# Patient Record
Sex: MALE | Race: OTHER | Employment: UNEMPLOYED | URBAN - METROPOLITAN AREA
[De-identification: names, ages, dates, MRNs, and addresses within clinical notes are randomized per-mention and may not be internally consistent; named-entity substitution may affect disease eponyms.]

---

## 2021-07-05 ENCOUNTER — APPOINTMENT (OUTPATIENT)
Dept: GENERAL RADIOLOGY | Age: 51
DRG: 192 | End: 2021-07-05

## 2021-07-05 ENCOUNTER — HOSPITAL ENCOUNTER (INPATIENT)
Age: 51
LOS: 2 days | Discharge: HOME | DRG: 192 | End: 2021-07-07
Attending: EMERGENCY MEDICINE | Admitting: INTERNAL MEDICINE

## 2021-07-05 DIAGNOSIS — J44.1 COPD EXACERBATION (HCC): Primary | ICD-10-CM

## 2021-07-05 LAB
ABSOLUTE EOS #: 1.39 K/UL (ref 0–0.44)
ABSOLUTE IMMATURE GRANULOCYTE: 0.04 K/UL (ref 0–0.3)
ABSOLUTE LYMPH #: 3.11 K/UL (ref 1.1–3.7)
ABSOLUTE MONO #: 0.94 K/UL (ref 0.1–1.2)
ANION GAP SERPL CALCULATED.3IONS-SCNC: 13 MMOL/L (ref 9–17)
BASOPHILS # BLD: 1 % (ref 0–2)
BASOPHILS ABSOLUTE: 0.13 K/UL (ref 0–0.2)
BUN BLDV-MCNC: 13 MG/DL (ref 6–20)
BUN/CREAT BLD: 20 (ref 9–20)
CALCIUM SERPL-MCNC: 9.9 MG/DL (ref 8.6–10.4)
CHLORIDE BLD-SCNC: 103 MMOL/L (ref 98–107)
CO2: 21 MMOL/L (ref 20–31)
CREAT SERPL-MCNC: 0.64 MG/DL (ref 0.7–1.2)
D-DIMER QUANTITATIVE: 0.38 MG/L FEU (ref 0–0.59)
DIFFERENTIAL TYPE: ABNORMAL
EOSINOPHILS RELATIVE PERCENT: 11 % (ref 1–4)
GFR AFRICAN AMERICAN: >60 ML/MIN
GFR NON-AFRICAN AMERICAN: >60 ML/MIN
GFR SERPL CREATININE-BSD FRML MDRD: ABNORMAL ML/MIN/{1.73_M2}
GFR SERPL CREATININE-BSD FRML MDRD: ABNORMAL ML/MIN/{1.73_M2}
GLUCOSE BLD-MCNC: 132 MG/DL (ref 70–99)
HCT VFR BLD CALC: 48.7 % (ref 40.7–50.3)
HEMOGLOBIN: 15.8 G/DL (ref 13–17)
IMMATURE GRANULOCYTES: 0 %
LYMPHOCYTES # BLD: 24 % (ref 24–43)
MCH RBC QN AUTO: 29.6 PG (ref 25.2–33.5)
MCHC RBC AUTO-ENTMCNC: 32.4 G/DL (ref 28.4–34.8)
MCV RBC AUTO: 91.4 FL (ref 82.6–102.9)
MONOCYTES # BLD: 7 % (ref 3–12)
MYOGLOBIN: 50 NG/ML (ref 28–72)
NRBC AUTOMATED: 0 PER 100 WBC
PDW BLD-RTO: 13.2 % (ref 11.8–14.4)
PLATELET # BLD: 300 K/UL (ref 138–453)
PLATELET ESTIMATE: ABNORMAL
PMV BLD AUTO: 10.3 FL (ref 8.1–13.5)
POTASSIUM SERPL-SCNC: 4.4 MMOL/L (ref 3.7–5.3)
RBC # BLD: 5.33 M/UL (ref 4.21–5.77)
RBC # BLD: ABNORMAL 10*6/UL
SARS-COV-2, RAPID: NOT DETECTED
SEG NEUTROPHILS: 57 % (ref 36–65)
SEGMENTED NEUTROPHILS ABSOLUTE COUNT: 7.22 K/UL (ref 1.5–8.1)
SODIUM BLD-SCNC: 137 MMOL/L (ref 135–144)
SPECIMEN DESCRIPTION: NORMAL
TROPONIN INTERP: NORMAL
TROPONIN T: NORMAL NG/ML
TROPONIN, HIGH SENSITIVITY: <6 NG/L (ref 0–22)
WBC # BLD: 12.8 K/UL (ref 3.5–11.3)
WBC # BLD: ABNORMAL 10*3/UL

## 2021-07-05 PROCEDURE — 93005 ELECTROCARDIOGRAM TRACING: CPT | Performed by: NURSE PRACTITIONER

## 2021-07-05 PROCEDURE — 99222 1ST HOSP IP/OBS MODERATE 55: CPT | Performed by: NURSE PRACTITIONER

## 2021-07-05 PROCEDURE — 96375 TX/PRO/DX INJ NEW DRUG ADDON: CPT

## 2021-07-05 PROCEDURE — 71045 X-RAY EXAM CHEST 1 VIEW: CPT

## 2021-07-05 PROCEDURE — 87635 SARS-COV-2 COVID-19 AMP PRB: CPT

## 2021-07-05 PROCEDURE — 84484 ASSAY OF TROPONIN QUANT: CPT

## 2021-07-05 PROCEDURE — 85379 FIBRIN DEGRADATION QUANT: CPT

## 2021-07-05 PROCEDURE — 1200000000 HC SEMI PRIVATE

## 2021-07-05 PROCEDURE — 80048 BASIC METABOLIC PNL TOTAL CA: CPT

## 2021-07-05 PROCEDURE — 83874 ASSAY OF MYOGLOBIN: CPT

## 2021-07-05 PROCEDURE — 94640 AIRWAY INHALATION TREATMENT: CPT

## 2021-07-05 PROCEDURE — 99285 EMERGENCY DEPT VISIT HI MDM: CPT

## 2021-07-05 PROCEDURE — 85025 COMPLETE CBC W/AUTO DIFF WBC: CPT

## 2021-07-05 PROCEDURE — 96365 THER/PROPH/DIAG IV INF INIT: CPT

## 2021-07-05 PROCEDURE — 6360000002 HC RX W HCPCS: Performed by: NURSE PRACTITIONER

## 2021-07-05 PROCEDURE — 2580000003 HC RX 258: Performed by: NURSE PRACTITIONER

## 2021-07-05 RX ORDER — ACETAMINOPHEN 325 MG/1
650 TABLET ORAL EVERY 6 HOURS PRN
Status: DISCONTINUED | OUTPATIENT
Start: 2021-07-05 | End: 2021-07-07 | Stop reason: HOSPADM

## 2021-07-05 RX ORDER — SODIUM CHLORIDE 9 MG/ML
25 INJECTION, SOLUTION INTRAVENOUS PRN
Status: DISCONTINUED | OUTPATIENT
Start: 2021-07-05 | End: 2021-07-07 | Stop reason: HOSPADM

## 2021-07-05 RX ORDER — ONDANSETRON 4 MG/1
4 TABLET, ORALLY DISINTEGRATING ORAL EVERY 8 HOURS PRN
Status: DISCONTINUED | OUTPATIENT
Start: 2021-07-05 | End: 2021-07-07 | Stop reason: HOSPADM

## 2021-07-05 RX ORDER — ONDANSETRON 2 MG/ML
4 INJECTION INTRAMUSCULAR; INTRAVENOUS EVERY 6 HOURS PRN
Status: DISCONTINUED | OUTPATIENT
Start: 2021-07-05 | End: 2021-07-07 | Stop reason: HOSPADM

## 2021-07-05 RX ORDER — ALBUTEROL SULFATE 2.5 MG/3ML
2.5 SOLUTION RESPIRATORY (INHALATION)
Status: DISCONTINUED | OUTPATIENT
Start: 2021-07-05 | End: 2021-07-07 | Stop reason: HOSPADM

## 2021-07-05 RX ORDER — SODIUM CHLORIDE 0.9 % (FLUSH) 0.9 %
5-40 SYRINGE (ML) INJECTION EVERY 12 HOURS SCHEDULED
Status: DISCONTINUED | OUTPATIENT
Start: 2021-07-05 | End: 2021-07-07 | Stop reason: HOSPADM

## 2021-07-05 RX ORDER — MAGNESIUM SULFATE 1 G/100ML
1000 INJECTION INTRAVENOUS ONCE
Status: COMPLETED | OUTPATIENT
Start: 2021-07-05 | End: 2021-07-05

## 2021-07-05 RX ORDER — METHYLPREDNISOLONE SODIUM SUCCINATE 125 MG/2ML
125 INJECTION, POWDER, LYOPHILIZED, FOR SOLUTION INTRAMUSCULAR; INTRAVENOUS ONCE
Status: COMPLETED | OUTPATIENT
Start: 2021-07-05 | End: 2021-07-05

## 2021-07-05 RX ORDER — METHYLPREDNISOLONE SODIUM SUCCINATE 40 MG/ML
40 INJECTION, POWDER, LYOPHILIZED, FOR SOLUTION INTRAMUSCULAR; INTRAVENOUS EVERY 6 HOURS
Status: DISCONTINUED | OUTPATIENT
Start: 2021-07-05 | End: 2021-07-07 | Stop reason: HOSPADM

## 2021-07-05 RX ORDER — SODIUM CHLORIDE 0.9 % (FLUSH) 0.9 %
5-40 SYRINGE (ML) INJECTION PRN
Status: DISCONTINUED | OUTPATIENT
Start: 2021-07-05 | End: 2021-07-07 | Stop reason: HOSPADM

## 2021-07-05 RX ORDER — NICOTINE 21 MG/24HR
1 PATCH, TRANSDERMAL 24 HOURS TRANSDERMAL DAILY PRN
Status: DISCONTINUED | OUTPATIENT
Start: 2021-07-05 | End: 2021-07-07 | Stop reason: HOSPADM

## 2021-07-05 RX ORDER — ACETAMINOPHEN 650 MG/1
650 SUPPOSITORY RECTAL EVERY 6 HOURS PRN
Status: DISCONTINUED | OUTPATIENT
Start: 2021-07-05 | End: 2021-07-07 | Stop reason: HOSPADM

## 2021-07-05 RX ORDER — PREDNISONE 20 MG/1
40 TABLET ORAL DAILY
Status: DISCONTINUED | OUTPATIENT
Start: 2021-07-08 | End: 2021-07-07 | Stop reason: HOSPADM

## 2021-07-05 RX ORDER — IPRATROPIUM BROMIDE AND ALBUTEROL SULFATE 2.5; .5 MG/3ML; MG/3ML
1 SOLUTION RESPIRATORY (INHALATION)
Status: DISCONTINUED | OUTPATIENT
Start: 2021-07-06 | End: 2021-07-06

## 2021-07-05 RX ORDER — SODIUM CHLORIDE 9 MG/ML
INJECTION, SOLUTION INTRAVENOUS CONTINUOUS
Status: DISCONTINUED | OUTPATIENT
Start: 2021-07-05 | End: 2021-07-06

## 2021-07-05 RX ORDER — ALBUTEROL SULFATE 2.5 MG/3ML
2.5 SOLUTION RESPIRATORY (INHALATION) EVERY 6 HOURS PRN
Status: DISCONTINUED | OUTPATIENT
Start: 2021-07-05 | End: 2021-07-06

## 2021-07-05 RX ORDER — POLYETHYLENE GLYCOL 3350 17 G/17G
17 POWDER, FOR SOLUTION ORAL DAILY PRN
Status: DISCONTINUED | OUTPATIENT
Start: 2021-07-05 | End: 2021-07-07 | Stop reason: HOSPADM

## 2021-07-05 RX ADMIN — SODIUM CHLORIDE: 9 INJECTION, SOLUTION INTRAVENOUS at 23:27

## 2021-07-05 RX ADMIN — METHYLPREDNISOLONE SODIUM SUCCINATE 40 MG: 40 INJECTION, POWDER, FOR SOLUTION INTRAMUSCULAR; INTRAVENOUS at 23:27

## 2021-07-05 RX ADMIN — METHYLPREDNISOLONE SODIUM SUCCINATE 125 MG: 125 INJECTION, POWDER, FOR SOLUTION INTRAMUSCULAR; INTRAVENOUS at 17:19

## 2021-07-05 RX ADMIN — MAGNESIUM SULFATE HEPTAHYDRATE 1000 MG: 1 INJECTION, SOLUTION INTRAVENOUS at 17:19

## 2021-07-05 ASSESSMENT — ENCOUNTER SYMPTOMS
ABDOMINAL PAIN: 0
SHORTNESS OF BREATH: 0
COUGH: 0
SINUS PRESSURE: 0
CONSTIPATION: 0
WHEEZING: 1
COLOR CHANGE: 0
COUGH: 1
BLOOD IN STOOL: 0
DIARRHEA: 0
NAUSEA: 0
SORE THROAT: 0
VOMITING: 0
RHINORRHEA: 0
WHEEZING: 0
SHORTNESS OF BREATH: 1

## 2021-07-05 ASSESSMENT — PAIN SCALES - GENERAL: PAINLEVEL_OUTOF10: 9

## 2021-07-05 NOTE — ED NOTES
Pt resting on stretcher.   Pt reports feeling improved at this time s/p interventions in ED      Rosy Galvan RN  07/05/21 9596

## 2021-07-05 NOTE — ED NOTES
Pt to ED c/o SOB gradually worsening x 3 days. Pt reports hx of asthma, but states that he ran out of his inhaler about 2 years ago and never got a refill d/t not needing it. Pt reports that current SOB feels similar to past asthma exacerbations. Pt reports chest pain / body aches when coughing. Pt denies fever / chills at home.   On exam, pt with audible wheezing, pt with mild noted dyspnea, pt tachycardic      [facility], RN  07/05/21 2335

## 2021-07-06 ENCOUNTER — APPOINTMENT (OUTPATIENT)
Dept: CT IMAGING | Age: 51
DRG: 192 | End: 2021-07-06

## 2021-07-06 ENCOUNTER — APPOINTMENT (OUTPATIENT)
Dept: GENERAL RADIOLOGY | Age: 51
DRG: 192 | End: 2021-07-06

## 2021-07-06 LAB
ANION GAP SERPL CALCULATED.3IONS-SCNC: 14 MMOL/L (ref 9–17)
BUN BLDV-MCNC: 17 MG/DL (ref 6–20)
BUN/CREAT BLD: 22 (ref 9–20)
CALCIUM SERPL-MCNC: 9.9 MG/DL (ref 8.6–10.4)
CHLORIDE BLD-SCNC: 102 MMOL/L (ref 98–107)
CO2: 24 MMOL/L (ref 20–31)
CREAT SERPL-MCNC: 0.77 MG/DL (ref 0.7–1.2)
GFR AFRICAN AMERICAN: >60 ML/MIN
GFR NON-AFRICAN AMERICAN: >60 ML/MIN
GFR SERPL CREATININE-BSD FRML MDRD: ABNORMAL ML/MIN/{1.73_M2}
GFR SERPL CREATININE-BSD FRML MDRD: ABNORMAL ML/MIN/{1.73_M2}
GLUCOSE BLD-MCNC: 136 MG/DL (ref 70–99)
HCT VFR BLD CALC: 48.6 % (ref 40.7–50.3)
HEMOGLOBIN: 15.4 G/DL (ref 13–17)
MCH RBC QN AUTO: 29.4 PG (ref 25.2–33.5)
MCHC RBC AUTO-ENTMCNC: 31.7 G/DL (ref 28.4–34.8)
MCV RBC AUTO: 92.7 FL (ref 82.6–102.9)
NRBC AUTOMATED: 0 PER 100 WBC
PDW BLD-RTO: 13.1 % (ref 11.8–14.4)
PLATELET # BLD: 325 K/UL (ref 138–453)
PMV BLD AUTO: 10.6 FL (ref 8.1–13.5)
POTASSIUM SERPL-SCNC: 4.8 MMOL/L (ref 3.7–5.3)
PROCALCITONIN: 0.03 NG/ML
RBC # BLD: 5.24 M/UL (ref 4.21–5.77)
SODIUM BLD-SCNC: 140 MMOL/L (ref 135–144)
WBC # BLD: 10.8 K/UL (ref 3.5–11.3)

## 2021-07-06 PROCEDURE — 80048 BASIC METABOLIC PNL TOTAL CA: CPT

## 2021-07-06 PROCEDURE — 6370000000 HC RX 637 (ALT 250 FOR IP): Performed by: NURSE PRACTITIONER

## 2021-07-06 PROCEDURE — APPSS30 APP SPLIT SHARED TIME 16-30 MINUTES: Performed by: NURSE PRACTITIONER

## 2021-07-06 PROCEDURE — 94640 AIRWAY INHALATION TREATMENT: CPT

## 2021-07-06 PROCEDURE — 2580000003 HC RX 258: Performed by: INTERNAL MEDICINE

## 2021-07-06 PROCEDURE — 6360000002 HC RX W HCPCS: Performed by: NURSE PRACTITIONER

## 2021-07-06 PROCEDURE — 71046 X-RAY EXAM CHEST 2 VIEWS: CPT

## 2021-07-06 PROCEDURE — 36415 COLL VENOUS BLD VENIPUNCTURE: CPT

## 2021-07-06 PROCEDURE — 71260 CT THORAX DX C+: CPT

## 2021-07-06 PROCEDURE — 94761 N-INVAS EAR/PLS OXIMETRY MLT: CPT

## 2021-07-06 PROCEDURE — 84145 PROCALCITONIN (PCT): CPT

## 2021-07-06 PROCEDURE — 6370000000 HC RX 637 (ALT 250 FOR IP): Performed by: INTERNAL MEDICINE

## 2021-07-06 PROCEDURE — 6360000004 HC RX CONTRAST MEDICATION: Performed by: INTERNAL MEDICINE

## 2021-07-06 PROCEDURE — 1200000000 HC SEMI PRIVATE

## 2021-07-06 PROCEDURE — 85027 COMPLETE CBC AUTOMATED: CPT

## 2021-07-06 PROCEDURE — 99233 SBSQ HOSP IP/OBS HIGH 50: CPT | Performed by: INTERNAL MEDICINE

## 2021-07-06 RX ORDER — 0.9 % SODIUM CHLORIDE 0.9 %
80 INTRAVENOUS SOLUTION INTRAVENOUS ONCE
Status: COMPLETED | OUTPATIENT
Start: 2021-07-06 | End: 2021-07-06

## 2021-07-06 RX ORDER — IPRATROPIUM BROMIDE AND ALBUTEROL SULFATE 2.5; .5 MG/3ML; MG/3ML
1 SOLUTION RESPIRATORY (INHALATION) 2 TIMES DAILY
Status: DISCONTINUED | OUTPATIENT
Start: 2021-07-06 | End: 2021-07-06

## 2021-07-06 RX ORDER — BUDESONIDE AND FORMOTEROL FUMARATE DIHYDRATE 80; 4.5 UG/1; UG/1
2 AEROSOL RESPIRATORY (INHALATION) 2 TIMES DAILY
Status: DISCONTINUED | OUTPATIENT
Start: 2021-07-06 | End: 2021-07-07 | Stop reason: HOSPADM

## 2021-07-06 RX ORDER — IPRATROPIUM BROMIDE AND ALBUTEROL SULFATE 2.5; .5 MG/3ML; MG/3ML
1 SOLUTION RESPIRATORY (INHALATION)
Status: DISCONTINUED | OUTPATIENT
Start: 2021-07-06 | End: 2021-07-07 | Stop reason: HOSPADM

## 2021-07-06 RX ORDER — GUAIFENESIN/DEXTROMETHORPHAN 100-10MG/5
5 SYRUP ORAL EVERY 4 HOURS PRN
Status: DISCONTINUED | OUTPATIENT
Start: 2021-07-06 | End: 2021-07-07 | Stop reason: HOSPADM

## 2021-07-06 RX ORDER — SODIUM CHLORIDE 0.9 % (FLUSH) 0.9 %
10 SYRINGE (ML) INJECTION PRN
Status: DISCONTINUED | OUTPATIENT
Start: 2021-07-06 | End: 2021-07-07 | Stop reason: HOSPADM

## 2021-07-06 RX ADMIN — IOPAMIDOL 75 ML: 755 INJECTION, SOLUTION INTRAVENOUS at 19:16

## 2021-07-06 RX ADMIN — IPRATROPIUM BROMIDE AND ALBUTEROL SULFATE 1 AMPULE: .5; 2.5 SOLUTION RESPIRATORY (INHALATION) at 21:06

## 2021-07-06 RX ADMIN — GUAIFENESIN AND DEXTROMETHORPHAN 5 ML: 100; 10 SYRUP ORAL at 04:55

## 2021-07-06 RX ADMIN — SODIUM CHLORIDE 80 ML: 9 INJECTION, SOLUTION INTRAVENOUS at 19:16

## 2021-07-06 RX ADMIN — METHYLPREDNISOLONE SODIUM SUCCINATE 40 MG: 40 INJECTION, POWDER, FOR SOLUTION INTRAMUSCULAR; INTRAVENOUS at 11:10

## 2021-07-06 RX ADMIN — ALBUTEROL SULFATE 2.5 MG: 2.5 SOLUTION RESPIRATORY (INHALATION) at 11:17

## 2021-07-06 RX ADMIN — SODIUM CHLORIDE, PRESERVATIVE FREE 10 ML: 5 INJECTION INTRAVENOUS at 19:16

## 2021-07-06 RX ADMIN — METHYLPREDNISOLONE SODIUM SUCCINATE 40 MG: 40 INJECTION, POWDER, FOR SOLUTION INTRAMUSCULAR; INTRAVENOUS at 17:39

## 2021-07-06 RX ADMIN — METHYLPREDNISOLONE SODIUM SUCCINATE 40 MG: 40 INJECTION, POWDER, FOR SOLUTION INTRAMUSCULAR; INTRAVENOUS at 04:55

## 2021-07-06 RX ADMIN — METHYLPREDNISOLONE SODIUM SUCCINATE 40 MG: 40 INJECTION, POWDER, FOR SOLUTION INTRAMUSCULAR; INTRAVENOUS at 22:54

## 2021-07-06 RX ADMIN — IPRATROPIUM BROMIDE AND ALBUTEROL SULFATE 1 AMPULE: .5; 2.5 SOLUTION RESPIRATORY (INHALATION) at 07:35

## 2021-07-06 ASSESSMENT — ENCOUNTER SYMPTOMS
CONSTIPATION: 0
DIARRHEA: 0
NAUSEA: 0
COLOR CHANGE: 0
WHEEZING: 1
COUGH: 1
SINUS PRESSURE: 0
SHORTNESS OF BREATH: 1
VOMITING: 0
ABDOMINAL PAIN: 0

## 2021-07-06 ASSESSMENT — PAIN SCALES - GENERAL
PAINLEVEL_OUTOF10: 0
PAINLEVEL_OUTOF10: 0

## 2021-07-06 NOTE — PLAN OF CARE
Problem: Breathing Pattern - Ineffective:  Goal: Ability to achieve and maintain a regular respiratory rate will improve  Outcome: Ongoing     Problem: Infection:  Goal: Will remain free from infection  Outcome: Ongoing     Problem: Daily Care:  Goal: Daily care needs are met  Outcome: Ongoing     Problem: Pain:  Goal: Patient's pain/discomfort is manageable  Outcome: Ongoing     Problem: Discharge Planning:  Goal: Patients continuum of care needs are met  Outcome: Ongoing     Problem: KNOWLEDGE DEFICIT  Goal: Patient/S.O. demonstrates understanding of disease process, treatment plan, medications, and discharge instructions.   Outcome: Ongoing

## 2021-07-06 NOTE — ED NOTES
Report called to Carroll Hicks RN.   All questions answered at this time      Manny Espinoza RN  07/05/21 6281

## 2021-07-06 NOTE — PLAN OF CARE
Problem: Breathing Pattern - Ineffective:  Goal: Ability to achieve and maintain a regular respiratory rate will improve  Description: Ability to achieve and maintain a regular respiratory rate will improve  7/6/2021 1046 by Niko Sandoval RN  Outcome: Ongoing  Note: Pt. Reports improvement in breathing. Expiratory wheezes and rhonchi lung sounds heard. Problem: Falls - Risk of:  Goal: Will remain free from falls  Description: Will remain free from falls  Outcome: Ongoing  Note: Gait is steady and currently low fall risk.

## 2021-07-06 NOTE — ED PROVIDER NOTES
EMERGENCY DEPARTMENT ENCOUNTER   ATTENDING ATTESTATION     Pt Name: Carl Vallecillo  MRN: [id number]  [place] 1970  Date of evaluation: 7/5/21   Carl Vallecillo is a 53 y.o. male with CC: Shortness of Breath (x 3 days)    MDM:   Patient is a 56-year-old male here with cough, shortness of breath for few days. He has had a cough as well. He has felt congested in his lungs. He states he has a history of asthma but has not flared up in quite some time. He does smoke. Denies history of heart problems blood clots recent surgeries leg swelling hemoptysis. Denies any vomiting. On exam he is coughing, he is afebrile he is in the low 90s on room air speaking in 4-5 word sentences he has diminished breath sounds and expiratory and inspiratory wheezes. He is tachypneic. Plan is for cardiac work-up, chest x-ray, asthma treatment steroids breathing treatments, Covid test, likely admission. CRITICAL CARE:       EKG: All EKG's are interpreted by the Emergency Department Physician who either signs or Co-signs this chart in the absence of a cardiologist.    Normal sinus rhythm rate of 98 normal intervals right axis no ST elevations or depressions no T wave changes Q-wave in lead III, abnormal EKG    RADIOLOGY:All plain film, CT, MRI, and formal ultrasound images (except ED bedside ultrasound) are read by the radiologist, see reports below, unless otherwise noted in MDM or here. XR CHEST 1 VIEW   Final Result   Prominence of the ascending aortic contour again suggested. No focal   airspace disease or effusion. XR CHEST PORTABLE   Final Result   No acute cardiopulmonary disease. Mild prominence of the ascending aorta may be related to the slightly rotated   portable technique. A follow-up PA and lateral examination of the chest   would be helpful. LABS: All lab results were reviewed by myself, and all abnormals are listed below.   Labs Reviewed   CBC WITH AUTO DIFFERENTIAL - Abnormal; Notable for the following components:       Result Value    WBC 12.8 (*)     Eosinophils % 11 (*)     Absolute Eos # 1.39 (*)     All other components within normal limits   BASIC METABOLIC PANEL - Abnormal; Notable for the following components:    Glucose 132 (*)     CREATININE 0.64 (*)     All other components within normal limits   COVID-19, RAPID   D-DIMER, QUANTITATIVE   TROP/MYOGLOBIN     CONSULTS:  IP CONSULT TO INTERNAL MEDICINE  FINAL IMPRESSION    No diagnosis found. PASTMEDICAL HISTORY     Past Medical History:   Diagnosis Date    Asthma      SURGICAL HISTORY     History reviewed. No pertinent surgical history. CURRENT MEDICATIONS       Previous Medications    No medications on file     ALLERGIES     has No Known Allergies. FAMILY HISTORY     has no family status information on file. SOCIAL HISTORY       Social History     Tobacco Use    Smoking status: Current Every Day Smoker     Packs/day: 1.00    Smokeless tobacco: Never Used   Substance Use Topics    Alcohol use: Never    Drug use: Never       I personally evaluated and examined the patient in conjunction with the APC and agree with the assessment, treatment plan, and disposition of the patient as recorded by the APC.    Rocky Simmons MD  Attending Emergency Physician          Rocky Simmons MD  07/05/21 3311

## 2021-07-06 NOTE — PROGRESS NOTES
[facility]  Office: [address], DO, [place], DO, Dulce Vidal, DO, Marzoeed Red Blood, DO, Elmo Campos MD, Sandie Hunter MD, Betsy Roman MD, Kadie Foss MD, Michael Delgado MD, Daysi Diaz MD, Rossana Duarte MD, Dorothea Gaona, DO, Cristian Dockery MD, Cecil Tyler, DO, Nan Marcus MD,  Carlos Enrique Patel, DO, Rosio Alfonso MD, Penny Denny MD, Francois Fulton MD, Karis Mathew MD, Tonie Brandt MD, aNti Paul MD, Bongese Aguilar [facility], [facility], [facility], Lesa Vázquez, CNP, Cata Caballero, CNS, Bharath Cabral, CNP, Kathia Jenkins, CNP, Elver Cope, CNP, Del Perez, CNP, John Loya CNP, Manjeet Rubalcava PA-C, Nadege Reddy, [facility], Cong Bai, CNP, Katherine Bloom, CNP, Crys Aponte, [facility], Tala Garcia, CNP, Levi Pires CNP, Aracelis Field CNP, Yanet Evangelista, CNP, [facility]    Progress Note    7/6/2021    1:38 PM    Name:   Cele Cruz  MRN:     [id number]     [place]:      [de-identified]   Room:   2023/2023-01   Day:  1  Admit Date:  7/5/2021  4:52 PM    PCP:   No primary care provider on file. Code Status:  Full Code    Subjective:     C/C:   Chief Complaint   Patient presents with    Shortness of Breath     x 3 days     Interval History Status: No change    Examined this afternoon. No new complaints, although, patient denies any improvement. \"I feel the same\". He gets only short-term relief with the aerosols. Brief History:     67-year-old male with history of asthma normally well-controlled. He has not needed a rescue inhaler in over 5 years. He does not follow with a pulmonologist.  He presented to the emergency room yesterday with 3-day history of shortness of breath. No chest pain or fevers. Occasional cough. He is a smoker. Covid negative in the ER. CXR did not show any acute findings but did show prominence of the ascending aortic contour.  CBC was 12.8 in ED    Review of Systems:     Review of Systems   Constitutional: Negative for activity change, fatigue and fever. HENT: Negative for congestion and sinus pressure. Respiratory: Positive for cough, shortness of breath and wheezing. Cardiovascular: Negative for chest pain and palpitations. Gastrointestinal: Negative for abdominal pain, constipation, diarrhea, nausea and vomiting. Musculoskeletal: Negative for arthralgias and myalgias. Skin: Negative for color change and rash. Neurological: Negative for dizziness and headaches. Psychiatric/Behavioral: Negative for confusion and decreased concentration. Medications: Allergies:  No Known Allergies    Current Meds:   Scheduled Meds:    ipratropium-albuterol  1 ampule Inhalation BID    sodium chloride flush  5-40 mL Intravenous 2 times per day    enoxaparin  40 mg Subcutaneous Daily    methylPREDNISolone  40 mg Intravenous Q6H    Followed by   Caridad Degroot ON 2021] predniSONE  40 mg Oral Daily     Continuous Infusions:    sodium chloride 75 mL/hr at 21 2327    sodium chloride       PRN Meds: guaiFENesin-dextromethorphan, albuterol, sodium chloride flush, sodium chloride, ondansetron **OR** ondansetron, polyethylene glycol, acetaminophen **OR** acetaminophen, albuterol, nicotine    Data:     Past Medical History:   has a past medical history of Asthma. Social History:   reports that he has been smoking. He has been smoking about 1.00 pack per day. He has never used smokeless tobacco. He reports that he does not drink alcohol and does not use drugs.      Family History:   Family History   Problem Relation Age of Onset    Cancer Father        Vitals:  /82   Pulse 118   Temp 98.4 °F (36.9 °C) (Oral)   Resp 19   Ht 5' 10\" (1.778 m)   Wt 157 lb 9.6 oz (71.5 kg)   SpO2 93%   BMI 22.61 kg/m²   Temp (24hrs), Av.9 °F (36.6 °C), Min:97.3 °F (36.3 °C), Max:98.4 °F (36.9 °C)    No results for input(s): POCGLU in the last 72 hours.    I/O (24Hr): No intake or output data in the 24 hours ending 07/06/21 1338    Labs:  Hematology:  Recent Labs     07/05/21 1710 07/06/21  0509   WBC 12.8* 10.8   RBC 5.33 5.24   HGB 15.8 15.4   HCT 48.7 48.6   MCV 91.4 92.7   MCH 29.6 29.4   MCHC 32.4 31.7   RDW 13.2 13.1    325   MPV 10.3 10.6   DDIMER 0.38  --      Chemistry:  Recent Labs     07/05/21 1710 07/06/21  0509    140   K 4.4 4.8    102   CO2 21 24   GLUCOSE 132* 136*   BUN 13 17   CREATININE 0.64* 0.77   ANIONGAP 13 14   LABGLOM >60 >60   GFRAA >60 >60   CALCIUM 9.9 9.9   TROPHS <6  --    MYOGLOBIN 50  --    No results for input(s): PROT, LABALBU, LABA1C, P4XVOED, R0LVJCB, FT4, TSH, AST, ALT, LDH, GGT, ALKPHOS, LABGGT, BILITOT, BILIDIR, AMMONIA, AMYLASE, LIPASE, LACTATE, CHOL, HDL, LDLCHOLESTEROL, CHOLHDLRATIO, TRIG, VLDL, OWL41BG, PHENYTOIN, PHENYF, URICACID, POCGLU in the last 72 hours. ABG:No results found for: POCPH, PHART, PH, POCPCO2, MZB1SAU, PCO2, POCPO2, PO2ART, PO2, POCHCO3, SHC9ODY, HCO3, NBEA, PBEA, BEART, BE, THGBART, THB, ZHY3WZX, KREZ8BOD, L3DZUKMP, O2SAT, FIO2  No results found for: SPECIAL  No results found for: CULTURE    Radiology:  XR CHEST PORTABLE    Result Date: 7/5/2021  No acute cardiopulmonary disease. Mild prominence of the ascending aorta may be related to the slightly rotated portable technique. A follow-up PA and lateral examination of the chest would be helpful. XR CHEST 1 VIEW    Result Date: 7/5/2021  Prominence of the ascending aortic contour again suggested. No focal airspace disease or effusion. Physical Examination:        Physical Exam  Constitutional:       Appearance: Normal appearance. HENT:      Right Ear: External ear normal.      Left Ear: External ear normal.   Eyes:      General:         Right eye: No discharge. Left eye: No discharge. Conjunctiva/sclera: Conjunctivae normal.   Cardiovascular:      Rate and Rhythm: Normal rate and regular rhythm. Pulmonary:      Effort: Pulmonary effort is normal.      Comments: Coarse breath sounds throughout  Abdominal:      General: Bowel sounds are normal.      Palpations: Abdomen is soft. Tenderness: There is no abdominal tenderness. Musculoskeletal:         General: Normal range of motion. Right lower leg: No edema. Left lower leg: No edema. Skin:     General: Skin is warm and dry. Neurological:      General: No focal deficit present. Mental Status: He is alert and oriented to person, place, and time. Psychiatric:         Mood and Affect: Mood normal.         Behavior: Behavior normal.         Assessment:        Hospital Problems         Last Modified POA    * (Principal) COPD exacerbation ([address] 7/5/2021 Yes    Tobacco dependence 7/5/2021 Yes          Plan:        1. COPD exacerbation-continue IV Solu-Medrol and aerosols. Supplemental oxygen as needed. Procalcitonin is within normal limits and white count is normal today. Antibiotics not indicated. Awaiting specimen for respiratory culture and sputum Gram stain. Awaiting repeat chest x-ray  2. Tobacco dependence-continue smoking cessation education, nicotine patch  3. Discharge will be to home (lives in [place]). May need maintenance inhalers at discharge in addition to the refill of his rescue inhaler.       ARTI GUARDADO CNP  7/6/2021  1:38 PM
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
## 2021-07-06 NOTE — FLOWSHEET NOTE
Pt with other healthcare professional at time of attempted visit. No needs presented from healthcare staff for spiritual care at this time. Chaplains will attempt to visit later. Chaplains will remain available to offer spiritual and emotional support as needed.        07/06/21 1648   Encounter Summary   Services provided to: Patient not available   Referral/Consult From: Julien   Continue Visiting   (7/6/21 not available)   Complexity of Encounter Low   Routine   Type Initial     Electronically signed by Kwasi Ch on 7/6/2021 at 4:49 PM.  [place]  [phone]

## 2021-07-06 NOTE — ED PROVIDER NOTES
[facility]  eMERGENCY dEPARTMENT eNCOUnter      Pt Name: Jo Day  MRN: [id number]  [place] 1970  Date of evaluation: 7/5/2021  Provider: Azra Bowden NP, ARTI Vargas 1829       Chief Complaint   Patient presents with    Shortness of Breath     x 3 days         HISTORY OF PRESENT ILLNESS  (Location/Symptom, Timing/Onset, Context/Setting, Quality, Duration, Modifying Factors, Severity.)   Jo Day is a 53 y.o. male who presents to the emergency department by private vehicle for evaluation of shortness of breath and wheezing. Patient states that he has a history of asthma. He states he ran out of his inhaler 2 years ago and never got a refill. He states he really has not had any problems with his breathing until 3 days ago. He states that his chest feels tight and he has some shortness of breath with wheezing. He does admit to smoking tobacco daily. He states that he has some body aches with coughing. He denies any associated fevers or chills at home. Nursing Notes were reviewed. ALLERGIES     Patient has no known allergies. CURRENT MEDICATIONS     There are no discharge medications for this patient. PAST MEDICAL HISTORY         Diagnosis Date    Asthma        SURGICAL HISTORY     History reviewed. No pertinent surgical history. FAMILY HISTORY     History reviewed. No pertinent family history. No family status information on file. SOCIAL HISTORY      reports that he has been smoking. He has been smoking about 1.00 pack per day. He has never used smokeless tobacco. He reports that he does not drink alcohol and does not use drugs. REVIEW OF SYSTEMS    (2-9 systems for level 4, 10 or more for level 5)     Review of Systems   Constitutional: Negative for chills, fever and unexpected weight change. HENT: Negative for congestion, rhinorrhea, sinus pressure and sore throat.     Respiratory: Negative for cough, shortness of breath and wheezing. Cardiovascular: Negative for chest pain and palpitations. Gastrointestinal: Negative for abdominal pain, constipation, diarrhea, nausea and vomiting. Genitourinary: Negative for dysuria and hematuria. Musculoskeletal: Negative for arthralgias and myalgias. Skin: Negative for color change and rash. Neurological: Negative for dizziness, weakness and headaches. Hematological: Negative for adenopathy. All other systems reviewed and are negative. Except as noted above the remainder of the review of systems was reviewed and negative. PHYSICAL EXAM    (up to 7 for level 4, 8 or more for level 5)     ED Triage Vitals [07/05/21 1645]   BP Temp Temp Source Pulse Resp SpO2 Height Weight   (!) 119/104 98 °F (36.7 °C) Oral 117 20 94 % 5' 10\" (1.778 m) 150 lb (68 kg)       Physical Exam  Vitals reviewed. Constitutional:       Appearance: He is well-developed. HENT:      Head: Normocephalic and atraumatic. Eyes:      Conjunctiva/sclera: Conjunctivae normal.      Pupils: Pupils are equal, round, and reactive to light. Cardiovascular:      Rate and Rhythm: Normal rate and regular rhythm. Pulmonary:      Effort: Pulmonary effort is normal. No respiratory distress. Breath sounds: Normal breath sounds. No stridor. Abdominal:      General: Bowel sounds are normal.      Palpations: Abdomen is soft. Musculoskeletal:         General: Normal range of motion. Cervical back: Normal range of motion and neck supple. Lymphadenopathy:      Cervical: No cervical adenopathy. Skin:     General: Skin is warm and dry. Findings: No rash. Neurological:      Mental Status: He is alert and oriented to person, place, and time.          RADIOLOGY:   Non-plain film images such as CT, Ultrasound and MRI are read by the radiologist. Plain radiographic images are visualized and preliminarily interpreted by the emergency physician with the below findings:    XR CHEST PORTABLE    Result Date: 7/5/2021  EXAMINATION: ONE XRAY VIEW OF THE CHEST 7/5/2021 5:25 pm COMPARISON: None. HISTORY: ORDERING SYSTEM PROVIDED HISTORY: Chest Pain TECHNOLOGIST PROVIDED HISTORY: Chest Pain Reason for Exam: sob Acuity: Unknown Type of Exam: Unknown Relevant Medical/Surgical History: sob, cough and chest pain FINDINGS: The patient is slightly rotated to the left on portable study. Heart size and configuration are normal.  The ascending aorta is slightly prominent. The lungs are clear. No pneumothorax or pleural fluid. No acute bone finding. No acute cardiopulmonary disease. Mild prominence of the ascending aorta may be related to the slightly rotated portable technique. A follow-up PA and lateral examination of the chest would be helpful. XR CHEST 1 VIEW    Result Date: 7/5/2021  EXAMINATION: ONE XRAY VIEW OF THE CHEST 7/5/2021 6:02 pm COMPARISON: Frontal radiograph today. HISTORY: ORDERING SYSTEM PROVIDED HISTORY: Chest Pain TECHNOLOGIST PROVIDED HISTORY: Chest Pain pa AND laTERAL Reason for Exam: sob Acuity: Unknown Type of Exam: Unknown Relevant Medical/Surgical History: sob, cough and chest pain FINDINGS: Lateral view demonstrates no focal airspace disease, evidence for pneumothorax or effusion. Prominence of the ascending aortic contour again noted. No osseous abnormality identified. Prominence of the ascending aortic contour again suggested. No focal airspace disease or effusion. Interpretation per the Radiologist below, if available at the time of this note:    XR CHEST 1 VIEW   Final Result   Prominence of the ascending aortic contour again suggested. No focal   airspace disease or effusion. XR CHEST PORTABLE   Final Result   No acute cardiopulmonary disease. Mild prominence of the ascending aorta may be related to the slightly rotated   portable technique. A follow-up PA and lateral examination of the chest   would be helpful.                  LABS:  Labs Reviewed   CBC WITH AUTO DIFFERENTIAL - Abnormal; Notable for the following components:       Result Value    WBC 12.8 (*)     Eosinophils % 11 (*)     Absolute Eos # 1.39 (*)     All other components within normal limits   BASIC METABOLIC PANEL - Abnormal; Notable for the following components:    Glucose 132 (*)     CREATININE 0.64 (*)     All other components within normal limits   COVID-19, RAPID   D-DIMER, QUANTITATIVE   TROP/MYOGLOBIN       All other labs were within normal range or not returned as of this dictation. EMERGENCY DEPARTMENT COURSE and DIFFERENTIAL DIAGNOSIS/MDM:   Vitals:    Vitals:    07/05/21 2001 07/05/21 2030 07/05/21 2032 07/05/21 2154   BP: (!) 157/97 (!) 140/94  (!) 133/95   Pulse: 118  106 110   Resp: 21 13 22   Temp:    97.3 °F (36.3 °C)   TempSrc:    Oral   SpO2: 92%   92%   Weight:       Height:           Medical Decision Making: Patient was given magnesium and Solu-Medrol. He still has diffuse wheezing throughout with coarse breath sounds. He will be admitted to the hospital for IV steroids and breathing treatments. Case was discussed with her medicine  Medications   albuterol (PROVENTIL) nebulizer solution 2.5 mg (has no administration in time range)   methylPREDNISolone sodium (SOLU-MEDROL) injection 125 mg (125 mg Intravenous Given 7/5/21 1719)   magnesium sulfate 1000 mg in dextrose 5% 100 mL IVPB (0 mg Intravenous Stopped 7/5/21 1825)       CONSULTS:  IP CONSULT TO INTERNAL MEDICINE    CRITICAL CARE TIME     Due to the high probability of sudden and clinically significant deterioration in the patient's condition he required highest level of my preparedness to intervene urgently. I provided critical care time including documentation time, medication orders and management, reevaluation, vital sign assessment, ordering and reviewing of of lab tests ordering and reviewing of x-ray studies, and admission orders.  Aggregate critical care time is 33 minutes including only time during which I was engaged in
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
## 2021-07-07 VITALS
SYSTOLIC BLOOD PRESSURE: 105 MMHG | BODY MASS INDEX: 22.56 KG/M2 | HEART RATE: 103 BPM | HEIGHT: 70 IN | DIASTOLIC BLOOD PRESSURE: 63 MMHG | OXYGEN SATURATION: 97 % | TEMPERATURE: 97.7 F | RESPIRATION RATE: 17 BRPM | WEIGHT: 157.6 LBS

## 2021-07-07 LAB
ABSOLUTE EOS #: <0.03 K/UL (ref 0–0.44)
ABSOLUTE IMMATURE GRANULOCYTE: 0.18 K/UL (ref 0–0.3)
ABSOLUTE LYMPH #: 1.7 K/UL (ref 1.1–3.7)
ABSOLUTE MONO #: 0.74 K/UL (ref 0.1–1.2)
ANION GAP SERPL CALCULATED.3IONS-SCNC: 15 MMOL/L (ref 9–17)
BASOPHILS # BLD: 0 % (ref 0–2)
BASOPHILS ABSOLUTE: 0.03 K/UL (ref 0–0.2)
BUN BLDV-MCNC: 11 MG/DL (ref 6–20)
BUN/CREAT BLD: 17 (ref 9–20)
CALCIUM SERPL-MCNC: 9.6 MG/DL (ref 8.6–10.4)
CHLORIDE BLD-SCNC: 101 MMOL/L (ref 98–107)
CO2: 23 MMOL/L (ref 20–31)
CREAT SERPL-MCNC: 0.63 MG/DL (ref 0.7–1.2)
DIFFERENTIAL TYPE: ABNORMAL
EKG ATRIAL RATE: 98 BPM
EKG P AXIS: 58 DEGREES
EKG P-R INTERVAL: 154 MS
EKG Q-T INTERVAL: 344 MS
EKG QRS DURATION: 88 MS
EKG QTC CALCULATION (BAZETT): 439 MS
EKG R AXIS: 91 DEGREES
EKG T AXIS: 46 DEGREES
EKG VENTRICULAR RATE: 98 BPM
EOSINOPHILS RELATIVE PERCENT: 0 % (ref 1–4)
GFR AFRICAN AMERICAN: >60 ML/MIN
GFR NON-AFRICAN AMERICAN: >60 ML/MIN
GFR SERPL CREATININE-BSD FRML MDRD: ABNORMAL ML/MIN/{1.73_M2}
GFR SERPL CREATININE-BSD FRML MDRD: ABNORMAL ML/MIN/{1.73_M2}
GLUCOSE BLD-MCNC: 138 MG/DL (ref 70–99)
HCT VFR BLD CALC: 43.1 % (ref 40.7–50.3)
HEMOGLOBIN: 13.9 G/DL (ref 13–17)
IMMATURE GRANULOCYTES: 1 %
LYMPHOCYTES # BLD: 9 % (ref 24–43)
MCH RBC QN AUTO: 29.6 PG (ref 25.2–33.5)
MCHC RBC AUTO-ENTMCNC: 32.3 G/DL (ref 28.4–34.8)
MCV RBC AUTO: 91.7 FL (ref 82.6–102.9)
MONOCYTES # BLD: 4 % (ref 3–12)
NRBC AUTOMATED: 0 PER 100 WBC
PDW BLD-RTO: 13.1 % (ref 11.8–14.4)
PLATELET # BLD: 304 K/UL (ref 138–453)
PLATELET ESTIMATE: ABNORMAL
PMV BLD AUTO: 10.8 FL (ref 8.1–13.5)
POTASSIUM SERPL-SCNC: 4.2 MMOL/L (ref 3.7–5.3)
RBC # BLD: 4.7 M/UL (ref 4.21–5.77)
RBC # BLD: ABNORMAL 10*6/UL
SEG NEUTROPHILS: 87 % (ref 36–65)
SEGMENTED NEUTROPHILS ABSOLUTE COUNT: 17.06 K/UL (ref 1.5–8.1)
SODIUM BLD-SCNC: 139 MMOL/L (ref 135–144)
WBC # BLD: 19.7 K/UL (ref 3.5–11.3)
WBC # BLD: ABNORMAL 10*3/UL

## 2021-07-07 PROCEDURE — 80048 BASIC METABOLIC PNL TOTAL CA: CPT

## 2021-07-07 PROCEDURE — 99239 HOSP IP/OBS DSCHRG MGMT >30: CPT | Performed by: INTERNAL MEDICINE

## 2021-07-07 PROCEDURE — 94640 AIRWAY INHALATION TREATMENT: CPT

## 2021-07-07 PROCEDURE — 6370000000 HC RX 637 (ALT 250 FOR IP): Performed by: NURSE PRACTITIONER

## 2021-07-07 PROCEDURE — 36415 COLL VENOUS BLD VENIPUNCTURE: CPT

## 2021-07-07 PROCEDURE — 6360000002 HC RX W HCPCS: Performed by: NURSE PRACTITIONER

## 2021-07-07 PROCEDURE — 2580000003 HC RX 258: Performed by: NURSE PRACTITIONER

## 2021-07-07 PROCEDURE — APPSS45 APP SPLIT SHARED TIME 31-45 MINUTES: Performed by: NURSE PRACTITIONER

## 2021-07-07 PROCEDURE — 94761 N-INVAS EAR/PLS OXIMETRY MLT: CPT

## 2021-07-07 PROCEDURE — 6370000000 HC RX 637 (ALT 250 FOR IP): Performed by: INTERNAL MEDICINE

## 2021-07-07 PROCEDURE — 85025 COMPLETE CBC W/AUTO DIFF WBC: CPT

## 2021-07-07 RX ORDER — PREDNISONE 20 MG/1
40 TABLET ORAL DAILY
Qty: 10 TABLET | Refills: 0 | Status: SHIPPED | OUTPATIENT
Start: 2021-07-07 | End: 2021-07-12

## 2021-07-07 RX ORDER — LANOLIN ALCOHOL/MO/W.PET/CERES
6 CREAM (GRAM) TOPICAL NIGHTLY PRN
Status: DISCONTINUED | OUTPATIENT
Start: 2021-07-07 | End: 2021-07-07 | Stop reason: HOSPADM

## 2021-07-07 RX ORDER — BUDESONIDE AND FORMOTEROL FUMARATE DIHYDRATE 80; 4.5 UG/1; UG/1
2 AEROSOL RESPIRATORY (INHALATION) 2 TIMES DAILY
Qty: 2 INHALER | Refills: 0 | Status: SHIPPED | OUTPATIENT
Start: 2021-07-07

## 2021-07-07 RX ORDER — ALBUTEROL SULFATE 90 UG/1
2 AEROSOL, METERED RESPIRATORY (INHALATION) EVERY 6 HOURS PRN
Qty: 1 INHALER | Refills: 0 | Status: SHIPPED | OUTPATIENT
Start: 2021-07-07

## 2021-07-07 RX ADMIN — Medication 6 MG: at 03:46

## 2021-07-07 RX ADMIN — IPRATROPIUM BROMIDE AND ALBUTEROL SULFATE 1 AMPULE: .5; 2.5 SOLUTION RESPIRATORY (INHALATION) at 07:25

## 2021-07-07 RX ADMIN — SODIUM CHLORIDE, PRESERVATIVE FREE 10 ML: 5 INJECTION INTRAVENOUS at 10:09

## 2021-07-07 RX ADMIN — METHYLPREDNISOLONE SODIUM SUCCINATE 40 MG: 40 INJECTION, POWDER, FOR SOLUTION INTRAMUSCULAR; INTRAVENOUS at 05:11

## 2021-07-07 RX ADMIN — METHYLPREDNISOLONE SODIUM SUCCINATE 40 MG: 40 INJECTION, POWDER, FOR SOLUTION INTRAMUSCULAR; INTRAVENOUS at 11:49

## 2021-07-07 RX ADMIN — SODIUM CHLORIDE, PRESERVATIVE FREE 10 ML: 5 INJECTION INTRAVENOUS at 11:49

## 2021-07-07 RX ADMIN — BUDESONIDE AND FORMOTEROL FUMARATE DIHYDRATE 2 PUFF: 80; 4.5 AEROSOL RESPIRATORY (INHALATION) at 07:25

## 2021-07-07 ASSESSMENT — ENCOUNTER SYMPTOMS
SINUS PRESSURE: 0
DIARRHEA: 0
COUGH: 0
WHEEZING: 0
SHORTNESS OF BREATH: 0
COLOR CHANGE: 0
VOMITING: 0
ABDOMINAL PAIN: 0
CONSTIPATION: 0
NAUSEA: 0

## 2021-07-07 NOTE — FLOWSHEET NOTE
Pt not in room at time of attempted visit. Writer leaves spiritual care card on tray table. Chaplains will remain available to offer spiritual and emotional support as needed.        07/07/21 0966   Encounter Summary   Services provided to: Patient not available   Referral/Consult From: Julien   Continue Visiting   (7/7/21)   Complexity of Encounter Low   Routine   Type Follow up   Intervention Provided reading materials/devotional materials     Electronically signed by Beulah Loo on 7/7/2021 at 4:55 PM.  [place]  [phone]

## 2021-07-07 NOTE — CARE COORDINATION
[address] [place] [address] Drive with pt re new pcp while he's here in town, did confirm with Dr. Davis Wang yesterday he does speak Pashto and is willing to see pt, explained he is self pay-pt can call office for appt.

## 2021-07-07 NOTE — DISCHARGE SUMMARY
[facility]  Office: [address], DO, Marietta Xiong, DO, Rosalio Kinsey, DO, [place] Yip Aurea, DO, Ken Vasquez MD, Pato Becerra MD, Adam Wilburn MD, Fritz Dennison MD, Ryanne Schultz MD, Cayla Flor MD, Mara Mensah MD, Jonas Torres, DO, Radha Bush MD, Emily Leavitt DO, Polo Pruitt MD,  Jake Joshi DO, Humberto Murry MD, Minor Lomeli MD, Heaven Saenz MD, Froy Salgado MD, Kathy Lang MD, Vinod Hernandez MD, Yahir Aragon [facility], [facility], CNP, Julian Dumont, CNP, Hernan Steele, CNS, Devora Bailey, CNP, Audrey Romero, CNP, Leonor Bolanos, CNP, Bam Griffin, CNP, Girma Tabares, CNP, Torres March PA-C, Samantha Hunt, [facility], Kelley Simmons, CNP, Rachel Perez, CNP, Tuan Bell, CNP, Natasha Pham, CNP, Jyoti Vincent, CNP, Leslye Roche, CNP, Gennaro Hardin, [facility], [facility]    Discharge Summary     Patient ID: Dora Cottrell  :  1970   MRN: [id number]     ACCOUNT:  [de-identified]   Patient's PCP: No primary care provider on file. Admit Date: 2021   Discharge Date: 2021   Length of Stay: 2  Code Status:  Full Code  Admitting Physician: Amy Soler DO  Discharge Physician: Aquiles Vázquez, ARTI - CNP     Active Discharge Diagnoses:     Hospital Problem Lists:  Principal Problem (Resolved):    COPD exacerbation ([facility].)  Active Problems:    Tobacco dependence      Admission Condition:  fair    Discharged Condition: good    Hospital Stay:     Hospital Course:  Dora Cottrell is a 53 y.o. male who was admitted for the management of  COPD exacerbation ([facility].) , presented to ER with Shortness of Breath (x 3 days)    Admitted for COPD exacerbation. Chest x-ray did not have any evidence of pneumonia, procalcitonin was normal and he has normal white count. Antibiotics are not indicated. Blood work was unremarkable through most of his stay.   He had significant provement after DuoNeb's were increased to every 4 hours while awake in addition to his steroids. He received smoking cessation education, incentive spirometer and Acapella. Significant therapeutic interventions: DuoNeb aerosols and steroids    Significant Diagnostic Studies: CT chest 7/6/2021  Mild emphysema.  Clear lungs.       Aneurysmal enlargement of the ascending thoracic aorta, measuring 4.4 cm.  No   acute aortic abnormality.       3.1 cm right lower pole thyroid nodule.  If not a known finding, further   evaluation with a thyroid ultrasound is recommended.       RECOMMENDATIONS:   3.1 cm incidental right thyroid nodule. Recommend thyroid US. Reference: J Am Zachariah Radiol. 2015 Feb;12(2): 143-50       Labs / Micro:  CBC:   Lab Results   Component Value Date    WBC 19.7 07/07/2021    RBC 4.70 07/07/2021    HGB 13.9 07/07/2021    HCT 43.1 07/07/2021    MCV 91.7 07/07/2021    MCH 29.6 07/07/2021    MCHC 32.3 07/07/2021    RDW 13.1 07/07/2021     07/07/2021     BMP:    Lab Results   Component Value Date    GLUCOSE 138 07/07/2021     07/07/2021    K 4.2 07/07/2021     07/07/2021    CO2 23 07/07/2021    ANIONGAP 15 07/07/2021    BUN 11 07/07/2021    CREATININE 0.63 07/07/2021    BUNCRER 17 07/07/2021    CALCIUM 9.6 07/07/2021    LABGLOM >60 07/07/2021    GFRAA >60 07/07/2021    GFR      07/07/2021    GFR NOT REPORTED 07/07/2021       Radiology:  XR CHEST (2 VW)    Result Date: 7/6/2021  No acute cardiopulmonary findings     CT CHEST W CONTRAST    Result Date: 7/6/2021  Mild emphysema. Clear lungs. Aneurysmal enlargement of the ascending thoracic aorta, measuring 4.4 cm. No acute aortic abnormality. 3.1 cm right lower pole thyroid nodule. If not a known finding, further evaluation with a thyroid ultrasound is recommended. RECOMMENDATIONS: 3.1 cm incidental right thyroid nodule. Recommend thyroid US. Reference: J Am Zachariah Radiol.  2015 Feb;12(2): 143-50     XR CHEST PORTABLE    Result Date: 7/5/2021  No acute cardiopulmonary disease. Mild prominence of the ascending aorta may be related to the slightly rotated portable technique. A follow-up PA and lateral examination of the chest would be helpful. XR CHEST 1 VIEW    Result Date: 7/5/2021  Prominence of the ascending aortic contour again suggested. No focal airspace disease or effusion. Consultations:    Consults:     Final Specialist Recommendations/Findings:   None      The patient was seen and examined on day of discharge and this discharge summary is in conjunction with any daily progress note from day of discharge. Discharge plan:     Disposition: Home    Physician Follow Up: Follow-up with your PCP when you return home      You need to follow-up with your primary care provider regarding his COPD. You also need referrals to vascular surgeon regarding your aneurysm and follow-up for the thyroid       Requiring Further Evaluation/Follow Up POST HOSPITALIZATION/Incidental Findings: CT of the chest had 2 incidental findings, 4.4 aneurysm and thyroid nodule. Both require follow-up    Diet: regular diet    Activity: As tolerated      Instructions to Patient: Patient educated on rescue inhaler and maintenance inhaler use. He was educated on the importance of following up with his PCP for referral to a vascular surgeon regarding the aneurysm and for thyroid ultrasound for the incidental finding on the CT.   He was given additional smoking cessation education at discharge      Discharge Medications:      Medication List      START taking these medications    albuterol sulfate  (90 Base) MCG/ACT inhaler  Commonly known as: ProAir HFA  Inhale 2 puffs into the lungs every 6 hours as needed for Wheezing     budesonide-formoterol 80-4.5 MCG/ACT Aero  Commonly known as: SYMBICORT  Inhale 2 puffs into the lungs 2 times daily     predniSONE 20 MG tablet  Commonly known as: DELTASONE  Take 2 tablets by mouth daily for 5 days Where to Get Your Medications      These medications were sent to [address], [address] [facility] [phone] [place] [phone]  [address] [place] [address], [facility] [address]  [address]    Phone: [phone]   · albuterol sulfate  (90 Base) MCG/ACT inhaler  · budesonide-formoterol 80-4.5 MCG/ACT Aero  · predniSONE 20 MG tablet         No discharge procedures on file. Time Spent on discharge is  20 mins in patient examination, evaluation, counseling as well as medication reconciliation, prescriptions for required medications, discharge plan and follow up. Electronically signed by   ARTI Ragsdale CNP  7/7/2021  3:27 PM      Thank you Dr. Bae Flavin primary care provider on file. for the opportunity to be involved in this patient's care.

## 2021-07-07 NOTE — PROGRESS NOTES
Transitions of Care Pharmacy Service   Medication Review      The patient does not currently take any prescription or OTC maintenance medications at home. No changes to the patient's home medication list were necessary. Source(s) of information: Patient      PROVIDER ACTION REQUESTED  Medications that need to be addressed by a physician/nurse practitioner:    Medication Action Requested        None         Please feel free to call me with any questions about this encounter. Thank you. This note will be reviewed and co-signed by the [facility] Pharmacist.    Cristobal Sweet PharmD student  [facility] [place] Pharmacy Service  Phone:  [phone]  Fax: [phone]      Electronically signed by Cristobal Sweet on 7/7/2021 at 4:31 PM      Aakash Gatica PharmD, [facility]  Emergency Department and Critical Care Pharmacist  7/7/2021 4:54 PM

## 2021-07-07 NOTE — PROGRESS NOTES
[facility]  Office: [address], DO, Luis A Fernandez, DO, Denzel Signs, DO, Padmini Lemus Blood, DO, Alise Shoemaker MD, Mignon Meckel, MD, Sandi Guevara MD, Estephania Cartagena MD, Vania Ford MD, Jill Rushing MD, Derrick Barillas MD, Enrrique Ma, DO, Yobani Parra MD, Tremayne Quevedo, DO, Tip Pina MD,  Prabhjot Pérez, DO, Dahlia Davalos MD, Robina Murrieta MD, Bertha Ordonez MD, Francisca Pena MD, Rigo Goodwin MD, Kellie Billingsley MD, You Smith, Fernando Mcdonald CNP, Mireya Lundy, CNP, Eben Ruelas, CNS, Alec Thornton, CNP, Christy Noe, CNP, Juan Alberto Rubalcava, CNP, Pablo Hollis, CNP, Westley Benavidez, CNP, Paul Alatorre PA-C, Roselyn Wells, [facility], Raquel Still, CNP, Ora Disla, CNP, Kris Matt, CNP, Caryle Poet, CNP, Mable Gonzalez, CNP, Cornelius Kulkarni, CNP, Clinton Disla, CNP, [facility]    Progress Note    7/7/2021    2:57 PM    Name:   Carl Vallecillo  MRN:     [id number]     [place]:      [de-identified]   Room:   2023/2023-01   Day:  2  Admit Date:  7/5/2021  4:52 PM    PCP:   No primary care provider on file. Code Status:  Full Code    Subjective:     C/C:   Chief Complaint   Patient presents with    Shortness of Breath     x 3 days     Interval History Status: No change    Examined this morning. No occurrences overnight. No new complaints. Patient states that he is feeling better. Brief History:     50-year-old male with history of asthma normally well-controlled. He has not needed a rescue inhaler in over 5 years. He does not follow with a pulmonologist.  He presented to the emergency room yesterday with 3-day history of shortness of breath. No chest pain or fevers. Occasional cough. He is a smoker. Covid negative in the ER. CXR did not show any acute findings but did show prominence of the ascending aortic contour.  CBC was 12.8 in ED        Review of Systems:     Review of Systems   Constitutional: Negative for activity change, fatigue and fever. HENT: Negative for congestion and sinus pressure. Respiratory: Negative for cough, shortness of breath and wheezing. Cardiovascular: Negative for chest pain and palpitations. Gastrointestinal: Negative for abdominal pain, constipation, diarrhea, nausea and vomiting. Musculoskeletal: Negative for arthralgias and myalgias. Skin: Negative for color change and rash. Neurological: Negative for dizziness and headaches. Psychiatric/Behavioral: Negative for confusion and decreased concentration. Medications: Allergies:  No Known Allergies    Current Meds:   Scheduled Meds:    ipratropium-albuterol  1 ampule Inhalation Q4H WA    budesonide-formoterol  2 puff Inhalation BID    sodium chloride flush  5-40 mL Intravenous 2 times per day    enoxaparin  40 mg Subcutaneous Daily    methylPREDNISolone  40 mg Intravenous Q6H    Followed by   Renan Nelson ON 2021] predniSONE  40 mg Oral Daily     Continuous Infusions:    sodium chloride       PRN Meds: melatonin, guaiFENesin-dextromethorphan, sodium chloride flush, sodium chloride flush, sodium chloride, ondansetron **OR** ondansetron, polyethylene glycol, acetaminophen **OR** acetaminophen, albuterol, nicotine    Data:     Past Medical History:   has a past medical history of Asthma. Social History:   reports that he has been smoking. He has been smoking about 1.00 pack per day. He has never used smokeless tobacco. He reports that he does not drink alcohol and does not use drugs.      Family History:   Family History   Problem Relation Age of Onset    Cancer Father        Vitals:  /63   Pulse 103   Temp 97.7 °F (36.5 °C) (Oral)   Resp 17   Ht 5' 10\" (1.778 m)   Wt 157 lb 9.6 oz (71.5 kg)   SpO2 97%   BMI 22.61 kg/m²   Temp (24hrs), Av.9 °F (36.6 °C), Min:97.7 °F (36.5 °C), Max:98.2 °F (36.8 °C)    No results for input(s): POCGLU in the last 72 hours.    I/O (24Hr): No intake or output data in the 24 hours ending 07/07/21 1457    Labs:  Hematology:  Recent Labs     07/05/21  1710 07/06/21  0509 07/07/21  0551   WBC 12.8* 10.8 19.7*   RBC 5.33 5.24 4.70   HGB 15.8 15.4 13.9   HCT 48.7 48.6 43.1   MCV 91.4 92.7 91.7   MCH 29.6 29.4 29.6   MCHC 32.4 31.7 32.3   RDW 13.2 13.1 13.1    325 304   MPV 10.3 10.6 10.8   DDIMER 0.38  --   --      Chemistry:  Recent Labs     07/05/21 1710 07/06/21  0509 07/07/21  0551    140 139   K 4.4 4.8 4.2    102 101   CO2 21 24 23   GLUCOSE 132* 136* 138*   BUN 13 17 11   CREATININE 0.64* 0.77 0.63*   ANIONGAP 13 14 15   LABGLOM >60 >60 >60   GFRAA >60 >60 >60   CALCIUM 9.9 9.9 9.6   TROPHS <6  --   --    MYOGLOBIN 50  --   --    No results for input(s): PROT, LABALBU, LABA1C, B5DIXTC, S0PGRLL, FT4, TSH, AST, ALT, LDH, GGT, ALKPHOS, LABGGT, BILITOT, BILIDIR, AMMONIA, AMYLASE, LIPASE, LACTATE, CHOL, HDL, LDLCHOLESTEROL, CHOLHDLRATIO, TRIG, VLDL, MEV25UM, PHENYTOIN, PHENYF, URICACID, POCGLU in the last 72 hours. ABG:No results found for: POCPH, PHART, PH, POCPCO2, SXC0PAY, PCO2, POCPO2, PO2ART, PO2, POCHCO3, FHK0TUT, HCO3, NBEA, PBEA, BEART, BE, THGBART, THB, QGR4GBY, UYEH2FVY, U8VREDFZ, O2SAT, FIO2  No results found for: SPECIAL  No results found for: CULTURE    Radiology:  XR CHEST PORTABLE    Result Date: 7/5/2021  No acute cardiopulmonary disease. Mild prominence of the ascending aorta may be related to the slightly rotated portable technique. A follow-up PA and lateral examination of the chest would be helpful. XR CHEST 1 VIEW    Result Date: 7/5/2021  Prominence of the ascending aortic contour again suggested. No focal airspace disease or effusion. Physical Examination:        Physical Exam  Constitutional:       Appearance: Normal appearance. HENT:      Right Ear: External ear normal.      Left Ear: External ear normal.   Eyes:      General:         Right eye: No discharge.          Left eye: No discharge. Conjunctiva/sclera: Conjunctivae normal.   Cardiovascular:      Rate and Rhythm: Normal rate and regular rhythm. Pulmonary:      Effort: Pulmonary effort is normal. No respiratory distress. Breath sounds: Normal breath sounds. Abdominal:      General: Bowel sounds are normal.      Palpations: Abdomen is soft. Tenderness: There is no abdominal tenderness. Musculoskeletal:         General: Normal range of motion. Right lower leg: No edema. Left lower leg: No edema. Skin:     General: Skin is warm and dry. Neurological:      General: No focal deficit present. Mental Status: He is alert and oriented to person, place, and time. Psychiatric:         Mood and Affect: Mood normal.         Behavior: Behavior normal.         Assessment:        Hospital Problems         Last Modified POA    * (Principal) COPD exacerbation ([address] 7/5/2021 Yes    Tobacco dependence 7/5/2021 Yes          Plan:        1. COPD exacerbation-duo nebs were increased to every 4 hours while awake. Patient has had significant improvement. CT of the chest shows mild emphysema. There is also aneurysmal enlargement of the ascending thoracic aorta and thyroid nodule. 2. Tobacco dependence-continue smoking cessation education, nicotine patch  3. Likely discharge this afternoon. Will be discharged home on Spiriva and rescue inhaler. Will follow up with his primary care physician for possible pulmonary referral.  Will also require follow-up for incidental findings on the CT chest.  Thyroid ultrasound outpatient yfn PCP.       ARTI GUARDADO CNP  7/7/2021  2:57 PM

## 2021-07-07 NOTE — PROGRESS NOTES
Patient discharged via ambulation with all his belongings to home in stable condition. Meds to bed were not delivered as patient is going to come back from home with cash to pick them up from outpatient pharmacy.  Patient understood and signed AVS.

## 2021-07-07 NOTE — DISCHARGE INSTR - DIET
Good nutrition is important when healing from an illness, injury, or surgery. Follow any nutrition recommendations given to you during your hospital stay. If you were given an oral nutrition supplement while in the hospital, continue to take this supplement at home. You can take it with meals, in-between meals, and/or before bedtime. These supplements can be purchased at most local grocery stores, pharmacies, and chain [employer]-stores. If you have any questions about your diet or nutrition, call the hospital and ask for the dietitian.     Regular diet

## 2021-07-07 NOTE — PLAN OF CARE
up x2  Bed low and locked  Call light in reach  Instructed to call out before getting out of bed  Anticipatory needs met  Goal: Absence of physical injury  Description: Absence of physical injury  7/7/2021 1140 by Dwight Pemberton RN  Outcome: Ongoing  7/7/2021 0305 by Giovanna Rojas RN  Outcome: Ongoing     Problem:  Activity:  Goal: Risk for activity intolerance will decrease  Description: Risk for activity intolerance will decrease  7/7/2021 1140 by Dwight Pemberton RN  Outcome: Ongoing  7/7/2021 0305 by Giovanna Rojas RN  Outcome: Ongoing     Problem: Coping:  Goal: Ability to identify and develop effective coping behavior will improve  Description: Ability to identify and develop effective coping behavior will improve  7/7/2021 1140 by Dwight Pemberton RN  Outcome: Ongoing  7/7/2021 0305 by Giovanna Rojas RN  Outcome: Ongoing     Problem: Fluid Volume:  Goal: Will show no signs or symptoms of fluid imbalance  Description: Will show no signs or symptoms of fluid imbalance  7/7/2021 1140 by Dwight Pemberton RN  Outcome: Ongoing  7/7/2021 0305 by Giovanna Rojas RN  Outcome: Ongoing     Problem: Health Behavior:  Goal: Ability to manage health-related needs will improve  Description: Ability to manage health-related needs will improve  7/7/2021 1140 by Dwight Pemberton RN  Outcome: Ongoing  7/7/2021 0305 by Giovanna Rojas RN  Outcome: Ongoing     Problem: Nutritional:  Goal: Maintenance of adequate nutrition will be supported  Description: Maintenance of adequate nutrition will be supported  7/7/2021 1140 by Dwight Pemberton RN  Outcome: Ongoing  7/7/2021 0305 by Giovanna Rojas RN  Outcome: Ongoing     Problem: Physical Regulation:  Goal: Complications related to the disease process, condition or treatment will be avoided or minimized  Description: Complications related to the disease process, condition or treatment will be avoided or minimized  7/7/2021 1140 by Dwight Pemberton RN  Outcome: Ongoing  7/7/2021 0305 by Giovanna Rojas RN  Outcome: Ongoing     Problem: Urinary Elimination:  Goal: Ability to achieve and maintain adequate urine output will be supported  Description: Ability to achieve and maintain adequate urine output will be supported  7/7/2021 1140 by Rosalio Leija RN  Outcome: Ongoing  7/7/2021 0305 by Alexandria Fischer RN  Outcome: Ongoing     Problem: Discharge Planning:  Goal: Discharged to appropriate level of care  Description: Discharged to appropriate level of care  7/7/2021 1140 by Rosalio Leija RN  Outcome: Ongoing  7/7/2021 0305 by Alexandria Fischer RN  Outcome: Ongoing  Note: Identify potential discharge needs/barriers on admission  Involve family/patient/significant other in discharge process  Collaborate with Case Management/ for discharge needs/concerns

## 2021-07-07 NOTE — PLAN OF CARE
Problem: Falls - Risk of:  Goal: Will remain free from falls  Description: Will remain free from falls  Outcome: Ongoing  Note: Room free of clutter  Hourly rounding   Non-skid socks worn  Side rails up x2  Bed low and locked  Call light in reach  Instructed to call out before getting out of bed  Anticipatory needs met     Problem: Discharge Planning:  Goal: Discharged to appropriate level of care  Description: Discharged to appropriate level of care  Outcome: Ongoing  Note: Identify potential discharge needs/barriers on admission  Involve family/patient/significant other in discharge process  Collaborate with Case Management/ for discharge needs/concerns